# Patient Record
Sex: FEMALE | Race: WHITE | Employment: PART TIME | URBAN - METROPOLITAN AREA
[De-identification: names, ages, dates, MRNs, and addresses within clinical notes are randomized per-mention and may not be internally consistent; named-entity substitution may affect disease eponyms.]

---

## 2020-03-05 ENCOUNTER — HOSPITAL ENCOUNTER (EMERGENCY)
Age: 55
Discharge: HOME | End: 2020-03-05
Attending: EMERGENCY MEDICINE
Payer: COMMERCIAL

## 2020-03-05 ENCOUNTER — APPOINTMENT (OUTPATIENT)
Dept: GENERAL RADIOLOGY | Age: 55
End: 2020-03-05
Payer: COMMERCIAL

## 2020-03-05 VITALS
DIASTOLIC BLOOD PRESSURE: 73 MMHG | BODY MASS INDEX: 35.36 KG/M2 | OXYGEN SATURATION: 97 % | SYSTOLIC BLOOD PRESSURE: 149 MMHG | HEIGHT: 61 IN | TEMPERATURE: 98.2 F | RESPIRATION RATE: 18 BRPM | WEIGHT: 187.3 LBS | HEART RATE: 92 BPM

## 2020-03-05 PROCEDURE — 72100 X-RAY EXAM L-S SPINE 2/3 VWS: CPT

## 2020-03-05 PROCEDURE — 72040 X-RAY EXAM NECK SPINE 2-3 VW: CPT

## 2020-03-05 PROCEDURE — 99283 EMERGENCY DEPT VISIT LOW MDM: CPT

## 2020-03-05 PROCEDURE — 73110 X-RAY EXAM OF WRIST: CPT

## 2020-03-05 RX ORDER — NAPROXEN 500 MG/1
500 TABLET ORAL 2 TIMES DAILY
Qty: 20 TABLET | Refills: 0 | Status: SHIPPED | OUTPATIENT
Start: 2020-03-05 | End: 2021-01-09

## 2020-03-05 RX ORDER — FLUOXETINE HYDROCHLORIDE 20 MG/1
20 CAPSULE ORAL DAILY
COMMUNITY
Start: 2019-07-05 | End: 2021-01-09

## 2020-03-05 ASSESSMENT — ENCOUNTER SYMPTOMS
SHORTNESS OF BREATH: 0
TROUBLE SWALLOWING: 0
PHOTOPHOBIA: 0
VOICE CHANGE: 0
FACIAL SWELLING: 0
BACK PAIN: 1
COLOR CHANGE: 1
NAUSEA: 0
VOMITING: 0

## 2020-03-05 ASSESSMENT — PAIN SCALES - GENERAL: PAINLEVEL_OUTOF10: 10

## 2020-03-05 NOTE — LETTER
[facility] ED  [address] [address]  Phone: [phone]             March 5, 2020    Patient: Sher Clark   YOB: 1965   Date of Visit: 3/5/2020       To Whom It May Concern:    Raymond Ward was seen and treated in our emergency department on 3/5/2020. Please excuse her from work 3-5-2020 and 3-6-2020.     Sincerely,             Signature:__________________________________

## 2020-03-06 NOTE — ED PROVIDER NOTES
eMERGENCY dEPARTMENT eNCOUnter   [address] Name: Candace Ferrera  MRN: [id number]  [place] 1965  Date of evaluation: 3/6/20     Candace Ferrera is a 60 y.o. female with CC: Back Pain (fell at work) and Hand Pain (right)      Based on the medical record the care appears appropriate. I was personally available for consultation in the Emergency Department.     Saima Tolentino MD  Attending Emergency Physician                    Louisa Villa MD  03/06/20 3235
fatigue and fever. HENT: Negative for ear discharge, ear pain, facial swelling, nosebleeds, trouble swallowing and voice change. Eyes: Negative for photophobia and visual disturbance. Respiratory: Negative for shortness of breath. Cardiovascular: Negative for chest pain. Gastrointestinal: Negative for nausea and vomiting. Genitourinary: Negative for difficulty urinating, dysuria and hematuria. Musculoskeletal: Positive for arthralgias, back pain, myalgias and neck pain. Negative for gait problem. Skin: Positive for color change. Negative for rash and wound. Neurological: Negative for dizziness, syncope, facial asymmetry, speech difficulty, weakness, light-headedness, numbness and headaches. Psychiatric/Behavioral: Negative for confusion. Except as noted above the remainder of the review of systems was reviewed and negative. PHYSICAL EXAM    (up to 7 for level 4, 8 or more for level 5)     ED Triage Vitals   BP Temp Temp src Pulse Resp SpO2 Height Weight   03/05/20 2109 03/05/20 2109 -- 03/05/20 2109 03/05/20 2109 03/05/20 2109 03/05/20 2110 03/05/20 2110   (!) 149/73 98.2 °F (36.8 °C)  92 18 97 % 5' 1\" (1.549 m) 187 lb 4.8 oz (85 kg)     Physical Exam  Vitals signs reviewed. Constitutional:       General: She is not in acute distress. Appearance: She is well-developed. She is not diaphoretic. HENT:      Right Ear: External ear normal.      Left Ear: External ear normal.      Nose: Nose normal.      Mouth/Throat:      Mouth: Mucous membranes are moist.      Pharynx: Oropharynx is clear. Eyes:      Extraocular Movements: Extraocular movements intact. Conjunctiva/sclera: Conjunctivae normal.      Pupils: Pupils are equal, round, and reactive to light. Cardiovascular:      Pulses: Normal pulses. Pulmonary:      Effort: Pulmonary effort is normal. No respiratory distress. Musculoskeletal:      Right wrist: She exhibits decreased range of motion and tenderness.  She

## 2020-08-06 ENCOUNTER — APPOINTMENT (OUTPATIENT)
Dept: GENERAL RADIOLOGY | Age: 55
End: 2020-08-06
Payer: COMMERCIAL

## 2020-08-06 ENCOUNTER — HOSPITAL ENCOUNTER (EMERGENCY)
Age: 55
Discharge: HOME | End: 2020-08-06
Attending: EMERGENCY MEDICINE
Payer: COMMERCIAL

## 2020-08-06 VITALS
RESPIRATION RATE: 12 BRPM | HEART RATE: 74 BPM | SYSTOLIC BLOOD PRESSURE: 147 MMHG | DIASTOLIC BLOOD PRESSURE: 80 MMHG | OXYGEN SATURATION: 99 % | TEMPERATURE: 98.3 F

## 2020-08-06 PROCEDURE — 71046 X-RAY EXAM CHEST 2 VIEWS: CPT

## 2020-08-06 PROCEDURE — 96372 THER/PROPH/DIAG INJ SC/IM: CPT

## 2020-08-06 PROCEDURE — 99283 EMERGENCY DEPT VISIT LOW MDM: CPT

## 2020-08-06 PROCEDURE — 6360000002 HC RX W HCPCS: Performed by: EMERGENCY MEDICINE

## 2020-08-06 RX ORDER — CYCLOBENZAPRINE HCL 10 MG
10 TABLET ORAL EVERY 8 HOURS PRN
Qty: 20 TABLET | Refills: 0 | Status: SHIPPED | OUTPATIENT
Start: 2020-08-06 | End: 2021-01-09

## 2020-08-06 RX ORDER — KETOROLAC TROMETHAMINE 30 MG/ML
60 INJECTION, SOLUTION INTRAMUSCULAR; INTRAVENOUS ONCE
Status: COMPLETED | OUTPATIENT
Start: 2020-08-06 | End: 2020-08-06

## 2020-08-06 RX ORDER — ACETAMINOPHEN AND CODEINE PHOSPHATE 300; 30 MG/1; MG/1
1 TABLET ORAL EVERY 6 HOURS PRN
Qty: 20 TABLET | Refills: 0 | Status: SHIPPED | OUTPATIENT
Start: 2020-08-06 | End: 2020-08-11

## 2020-08-06 RX ADMIN — KETOROLAC TROMETHAMINE 60 MG: 30 INJECTION, SOLUTION INTRAMUSCULAR at 15:42

## 2020-08-06 ASSESSMENT — ENCOUNTER SYMPTOMS
COLOR CHANGE: 0
DIARRHEA: 0
EYE DISCHARGE: 0
ABDOMINAL PAIN: 0
CONSTIPATION: 0
EYE REDNESS: 0
FACIAL SWELLING: 0
SHORTNESS OF BREATH: 0
VOMITING: 0
COUGH: 0

## 2020-08-06 ASSESSMENT — PAIN SCALES - GENERAL
PAINLEVEL_OUTOF10: 10
PAINLEVEL_OUTOF10: 9

## 2020-08-06 ASSESSMENT — PAIN DESCRIPTION - PROGRESSION: CLINICAL_PROGRESSION: GRADUALLY WORSENING

## 2020-08-06 ASSESSMENT — PAIN DESCRIPTION - FREQUENCY: FREQUENCY: CONTINUOUS

## 2020-08-06 ASSESSMENT — PAIN DESCRIPTION - LOCATION: LOCATION: BACK

## 2020-08-06 ASSESSMENT — PAIN DESCRIPTION - PAIN TYPE: TYPE: ACUTE PAIN

## 2020-08-06 ASSESSMENT — PAIN DESCRIPTION - DESCRIPTORS: DESCRIPTORS: SPASM;ACHING;SHARP

## 2020-08-06 NOTE — LETTER
[facility] ED  [address] [address]  Phone: [phone]    No name on file. August 6, 2020     Patient: Elizabeth Wilson   YOB: 1965   Date of Visit: 8/6/2020       To Whom It May Concern: It is my medical opinion that Melissa Pettit may return to work on 08/07/20 with the following restrictions: lifting/carrying not to exceed 15 lbs. .    If you have any questions or concerns, please don't hesitate to call.     Sincerely,

## 2020-08-06 NOTE — ED PROVIDER NOTES
[address] [place] [address] ED  EMERGENCY DEPARTMENT ENCOUNTER      Pt Name: Js Mcgregor  MRN: [id number]  [place] 1965  Date of evaluation: 8/6/2020  Provider: Mau Miller MD    CHIEF COMPLAINT       Chief Complaint   Patient presents with    Back Pain     sudden onset after she encounter a turning movement around 2 hours prior to arrival.          HISTORY OF PRESENT ILLNESS  (Location/Symptom, Timing/Onset, Context/Setting, Quality, Duration, Modifying Factors, Severity.)   Js Mcgregor is a 60 y.o. female who presents to the emergency department for pain. She is complaining pain to the left lateral posterior rib area. It started when she was reaching this afternoon and she felt a pulling there. She did not fall and nothing struck her there. She does not have a cough and she has no shortness of breath or fever. She rated the pain as a 9. It is sharp and it feels like a spasm. Nursing Notes were reviewed. ALLERGIES     Nubain [nalbuphine]; Dilantin [phenytoin sodium extended]; Penicillins; and Sulfa antibiotics    CURRENT MEDICATIONS       Discharge Medication List as of 8/6/2020  3:36 PM      CONTINUE these medications which have NOT CHANGED    Details   FLUoxetine (PROZAC) 20 MG capsule Take 20 mg by mouth dailyHistorical Med      naproxen (NAPROSYN) 500 MG tablet Take 1 tablet by mouth 2 times daily, Disp-20 tablet, R-0Print             PAST MEDICAL HISTORY         Diagnosis Date    Depression     Headache     Hypertension     Seizures ([address]        SURGICAL HISTORY           Procedure Laterality Date    TONSILLECTOMY      TUBAL LIGATION      UTERINE FIBROID SURGERY           FAMILY HISTORY     No family history on file. No family status information on file. SOCIAL HISTORY      reports that she has never smoked. She has never used smokeless tobacco. She reports previous alcohol use. She reports that she does not use drugs.     REVIEW OF SYSTEMS    (2-9 systems for level 4, 10 or more for level 5)     Review of Systems   Constitutional: Negative for chills, fatigue and fever. HENT: Negative for congestion, ear discharge and facial swelling. Eyes: Negative for discharge and redness. Respiratory: Negative for cough and shortness of breath. Cardiovascular: Negative for chest pain. Gastrointestinal: Negative for abdominal pain, constipation, diarrhea and vomiting. Genitourinary: Negative for dysuria and hematuria. Musculoskeletal: Negative for arthralgias. Skin: Negative for color change and rash. Neurological: Negative for syncope, numbness and headaches. Hematological: Negative for adenopathy. Psychiatric/Behavioral: Negative for confusion. The patient is not nervous/anxious. Except as noted above the remainder of the review of systems was reviewed and negative. PHYSICAL EXAM    (up to 7 for level 4, 8 or more for level 5)     Vitals:    08/06/20 1438   BP: (!) 147/80   Pulse: 74   Resp: 12   Temp: 98.3 °F (36.8 °C)   SpO2: 99%       Physical Exam  Vitals signs reviewed. Constitutional:       General: She is not in acute distress. Appearance: She is well-developed. She is not diaphoretic. HENT:      Head: Normocephalic and atraumatic. Eyes:      General: No scleral icterus. Right eye: No discharge. Left eye: No discharge. Neck:      Musculoskeletal: Neck supple. Cardiovascular:      Rate and Rhythm: Normal rate and regular rhythm. Pulmonary:      Effort: Pulmonary effort is normal. No respiratory distress. Breath sounds: Normal breath sounds. No stridor. No wheezing or rales. Comments: There is no bruise or rash in her flank area. There seems to be some tenderness in the left flank area but there is no crepitus. Abdominal:      General: There is no distension. Palpations: Abdomen is soft. Tenderness: There is no abdominal tenderness. Musculoskeletal: Normal range of motion.    Lymphadenopathy: Cervical: No cervical adenopathy. Skin:     General: Skin is warm and dry. Findings: No erythema or rash. Neurological:      Mental Status: She is alert and oriented to person, place, and time. Psychiatric:         Behavior: Behavior normal.             DIAGNOSTIC RESULTS     EKG: All EKG's are interpreted by the Emergency Department Physician who either signs or Co-signs this chart in the absence of a cardiologist.    Not indicated    RADIOLOGY:   Non-plain film images such as CT, Ultrasound and MRI are read by the radiologist. Perla Garcia radiographic images are visualized and preliminarily interpreted by the emergency physician with the below findings:    Interpretation per the Radiologist below, if available at the time of this note:    Xr Chest (2 Vw)    Result Date: 8/6/2020  EXAMINATION: TWO XRAY VIEWS OF THE CHEST 8/6/2020 3:19 pm COMPARISON: Chest radiograph performed 04/13/2011. HISTORY: ORDERING SYSTEM PROVIDED HISTORY: left lateral posterior pain TECHNOLOGIST PROVIDED HISTORY: left lateral posterior pain Reason for Exam: Pain to left lateral chest wall without injury. Acuity: Acute Type of Exam: Initial FINDINGS: There is no acute consolidation or effusion. There is no pneumothorax. The mediastinal structures are unremarkable. The upper abdomen is unremarkable. The extrathoracic soft tissues are unremarkable. There is no acute osseous abnormality. No acute cardiopulmonary process. LABS:  Labs Reviewed - No data to display    All other labs were within normal range or not returned as of this dictation.     EMERGENCY DEPARTMENT COURSE and DIFFERENTIAL DIAGNOSIS/MDM:   Vitals:    Vitals:    08/06/20 1438   BP: (!) 147/80   Pulse: 74   Resp: 12   Temp: 98.3 °F (36.8 °C)   SpO2: 99%       Orders Placed This Encounter   Medications    ketorolac (TORADOL) injection 60 mg    acetaminophen-codeine (TYLENOL/CODEINE #3) 300-30 MG per tablet     Sig: Take 1 tablet by mouth every 6 hours as needed for Pain for up to 5 days. Dispense:  20 tablet     Refill:  0    cyclobenzaprine (FLEXERIL) 10 MG tablet     Sig: Take 1 tablet by mouth every 8 hours as needed for Muscle spasms     Dispense:  20 tablet     Refill:  0       Medical Decision Making: X-rays negative. No evidence of fracture or pneumothorax. Should be treated symptomatically with IM Toradol here and was prescribed Tylenol 3 and Flexeril. Treatment diagnosis and follow-up were discussed with the patient      CONSULTS:  None    PROCEDURES:  None    FINAL IMPRESSION      1. Chest wall muscle strain, initial encounter          DISPOSITION/PLAN   DISPOSITION Decision To Discharge 08/06/2020 03:35:14 PM      PATIENT REFERRED TO:   Miko Monet DO  [address] [address]  [phone]      As needed    [facility] ED  [address] [facility]  [phone]    If symptoms worsen      DISCHARGE MEDICATIONS:     Discharge Medication List as of 8/6/2020  3:36 PM      START taking these medications    Details   acetaminophen-codeine (TYLENOL/CODEINE #3) 300-30 MG per tablet Take 1 tablet by mouth every 6 hours as needed for Pain for up to 5 days. , Disp-20 tablet,R-0Print      cyclobenzaprine (FLEXERIL) 10 MG tablet Take 1 tablet by mouth every 8 hours as needed for Muscle spasms, Disp-20 tablet,R-0Print               (Please note that portions of this note were completed with a voice recognition program.  Efforts were made to edit the dictations but occasionally words are mis-transcribed.)    Lawyer Carly MD  Attending Emergency Physician           Lawyer Carly MD  08/06/20 Greer Light MD  08/18/20 9154

## 2021-01-09 ENCOUNTER — APPOINTMENT (OUTPATIENT)
Dept: GENERAL RADIOLOGY | Age: 56
End: 2021-01-09
Payer: COMMERCIAL

## 2021-01-09 ENCOUNTER — HOSPITAL ENCOUNTER (EMERGENCY)
Age: 56
Discharge: HOME | End: 2021-01-09
Attending: EMERGENCY MEDICINE
Payer: COMMERCIAL

## 2021-01-09 ENCOUNTER — APPOINTMENT (OUTPATIENT)
Dept: CT IMAGING | Age: 56
End: 2021-01-09
Payer: COMMERCIAL

## 2021-01-09 VITALS
HEART RATE: 90 BPM | OXYGEN SATURATION: 97 % | WEIGHT: 148 LBS | BODY MASS INDEX: 29.06 KG/M2 | RESPIRATION RATE: 18 BRPM | TEMPERATURE: 99 F | SYSTOLIC BLOOD PRESSURE: 143 MMHG | DIASTOLIC BLOOD PRESSURE: 85 MMHG | HEIGHT: 60 IN

## 2021-01-09 DIAGNOSIS — R06.89 DYSPNEA AND RESPIRATORY ABNORMALITIES: Primary | ICD-10-CM

## 2021-01-09 DIAGNOSIS — R06.00 DYSPNEA AND RESPIRATORY ABNORMALITIES: Primary | ICD-10-CM

## 2021-01-09 LAB
ABSOLUTE EOS #: 0.12 K/UL (ref 0–0.44)
ABSOLUTE IMMATURE GRANULOCYTE: 0.03 K/UL (ref 0–0.3)
ABSOLUTE LYMPH #: 1.07 K/UL (ref 1.1–3.7)
ABSOLUTE MONO #: 0.51 K/UL (ref 0.1–1.2)
ANION GAP SERPL CALCULATED.3IONS-SCNC: 13 MMOL/L (ref 9–17)
BASOPHILS # BLD: 1 % (ref 0–2)
BASOPHILS ABSOLUTE: 0.05 K/UL (ref 0–0.2)
BNP INTERPRETATION: NORMAL
BUN BLDV-MCNC: 12 MG/DL (ref 6–20)
BUN/CREAT BLD: 16 (ref 9–20)
CALCIUM SERPL-MCNC: 9.3 MG/DL (ref 8.6–10.4)
CHLORIDE BLD-SCNC: 103 MMOL/L (ref 98–107)
CO2: 25 MMOL/L (ref 20–31)
CREAT SERPL-MCNC: 0.73 MG/DL (ref 0.5–0.9)
DIFFERENTIAL TYPE: ABNORMAL
EKG ATRIAL RATE: 99 BPM
EKG P AXIS: 59 DEGREES
EKG P-R INTERVAL: 192 MS
EKG Q-T INTERVAL: 350 MS
EKG QRS DURATION: 94 MS
EKG QTC CALCULATION (BAZETT): 449 MS
EKG R AXIS: 22 DEGREES
EKG T AXIS: 41 DEGREES
EKG VENTRICULAR RATE: 99 BPM
EOSINOPHILS RELATIVE PERCENT: 2 % (ref 1–4)
GFR AFRICAN AMERICAN: >60 ML/MIN
GFR NON-AFRICAN AMERICAN: >60 ML/MIN
GFR SERPL CREATININE-BSD FRML MDRD: ABNORMAL ML/MIN/{1.73_M2}
GFR SERPL CREATININE-BSD FRML MDRD: ABNORMAL ML/MIN/{1.73_M2}
GLUCOSE BLD-MCNC: 112 MG/DL (ref 70–99)
HCT VFR BLD CALC: 33.6 % (ref 36.3–47.1)
HEMOGLOBIN: 10.8 G/DL (ref 11.9–15.1)
IMMATURE GRANULOCYTES: 0 %
LYMPHOCYTES # BLD: 15 % (ref 24–43)
MCH RBC QN AUTO: 29.4 PG (ref 25.2–33.5)
MCHC RBC AUTO-ENTMCNC: 32.1 G/DL (ref 28.4–34.8)
MCV RBC AUTO: 91.6 FL (ref 82.6–102.9)
MONOCYTES # BLD: 7 % (ref 3–12)
NRBC AUTOMATED: 0 PER 100 WBC
PDW BLD-RTO: 11.8 % (ref 11.8–14.4)
PLATELET # BLD: 409 K/UL (ref 138–453)
PLATELET ESTIMATE: ABNORMAL
PMV BLD AUTO: 10.6 FL (ref 8.1–13.5)
POTASSIUM SERPL-SCNC: 3.8 MMOL/L (ref 3.7–5.3)
PRO-BNP: 203 PG/ML
RBC # BLD: 3.67 M/UL (ref 3.95–5.11)
RBC # BLD: ABNORMAL 10*6/UL
SEG NEUTROPHILS: 75 % (ref 36–65)
SEGMENTED NEUTROPHILS ABSOLUTE COUNT: 5.3 K/UL (ref 1.5–8.1)
SODIUM BLD-SCNC: 141 MMOL/L (ref 135–144)
TROPONIN INTERP: NORMAL
TROPONIN INTERP: NORMAL
TROPONIN T: NORMAL NG/ML
TROPONIN T: NORMAL NG/ML
TROPONIN, HIGH SENSITIVITY: 10 NG/L (ref 0–14)
TROPONIN, HIGH SENSITIVITY: 9 NG/L (ref 0–14)
WBC # BLD: 7.1 K/UL (ref 3.5–11.3)
WBC # BLD: ABNORMAL 10*3/UL

## 2021-01-09 PROCEDURE — 85025 COMPLETE CBC W/AUTO DIFF WBC: CPT

## 2021-01-09 PROCEDURE — 71260 CT THORAX DX C+: CPT

## 2021-01-09 PROCEDURE — 71045 X-RAY EXAM CHEST 1 VIEW: CPT

## 2021-01-09 PROCEDURE — 80048 BASIC METABOLIC PNL TOTAL CA: CPT

## 2021-01-09 PROCEDURE — 84484 ASSAY OF TROPONIN QUANT: CPT

## 2021-01-09 PROCEDURE — 6360000004 HC RX CONTRAST MEDICATION: Performed by: EMERGENCY MEDICINE

## 2021-01-09 PROCEDURE — 93005 ELECTROCARDIOGRAM TRACING: CPT | Performed by: EMERGENCY MEDICINE

## 2021-01-09 PROCEDURE — 99283 EMERGENCY DEPT VISIT LOW MDM: CPT

## 2021-01-09 PROCEDURE — 83880 ASSAY OF NATRIURETIC PEPTIDE: CPT

## 2021-01-09 PROCEDURE — 2580000003 HC RX 258: Performed by: EMERGENCY MEDICINE

## 2021-01-09 RX ORDER — 0.9 % SODIUM CHLORIDE 0.9 %
80 INTRAVENOUS SOLUTION INTRAVENOUS ONCE
Status: COMPLETED | OUTPATIENT
Start: 2021-01-09 | End: 2021-01-09

## 2021-01-09 RX ORDER — HYDROCHLOROTHIAZIDE 25 MG/1
25 TABLET ORAL DAILY
COMMUNITY
Start: 2020-08-11

## 2021-01-09 RX ORDER — IRBESARTAN 300 MG/1
300 TABLET ORAL DAILY
COMMUNITY
Start: 2020-08-13

## 2021-01-09 RX ORDER — SODIUM CHLORIDE 0.9 % (FLUSH) 0.9 %
10 SYRINGE (ML) INJECTION PRN
Status: DISCONTINUED | OUTPATIENT
Start: 2021-01-09 | End: 2021-01-09 | Stop reason: HOSPADM

## 2021-01-09 RX ORDER — ATORVASTATIN CALCIUM 20 MG/1
20 TABLET, FILM COATED ORAL DAILY
COMMUNITY
Start: 2020-08-11

## 2021-01-09 RX ORDER — BUPROPION HYDROCHLORIDE 150 MG/1
TABLET ORAL
COMMUNITY
Start: 2020-10-14

## 2021-01-09 RX ORDER — 0.9 % SODIUM CHLORIDE 0.9 %
1000 INTRAVENOUS SOLUTION INTRAVENOUS ONCE
Status: COMPLETED | OUTPATIENT
Start: 2021-01-09 | End: 2021-01-09

## 2021-01-09 RX ORDER — RISPERIDONE 0.25 MG/1
0.25 TABLET, FILM COATED ORAL DAILY
COMMUNITY
Start: 2020-07-23

## 2021-01-09 RX ADMIN — SODIUM CHLORIDE 80 ML: 0.9 INJECTION, SOLUTION INTRAVENOUS at 11:10

## 2021-01-09 RX ADMIN — SODIUM CHLORIDE, PRESERVATIVE FREE 10 ML: 5 INJECTION INTRAVENOUS at 11:11

## 2021-01-09 RX ADMIN — SODIUM CHLORIDE 1000 ML: 9 INJECTION, SOLUTION INTRAVENOUS at 10:51

## 2021-01-09 RX ADMIN — IOPAMIDOL 75 ML: 755 INJECTION, SOLUTION INTRAVENOUS at 11:11

## 2021-01-09 ASSESSMENT — ENCOUNTER SYMPTOMS
COLOR CHANGE: 0
VOMITING: 0
SORE THROAT: 0
DIARRHEA: 0
NAUSEA: 0
COUGH: 1
EYE REDNESS: 0
EYE DISCHARGE: 0
SHORTNESS OF BREATH: 1
RHINORRHEA: 0

## 2021-01-09 NOTE — ED PROVIDER NOTES
EMERGENCY DEPARTMENT ENCOUNTER    Pt Name: Ki Maurice  MRN: [id number]  [place] 1965  Date of evaluation: 1/9/21  CHIEF COMPLAINT       Chief Complaint   Patient presents with    Positive For Covid-19    Cough     HISTORY OF PRESENT ILLNESS   This is a 78-year-old female that presents with complaints of some shortness of breath, dyspnea on exertion and generally not feeling well. The patient states that she was diagnosed with COVID-19 on 23 December, the patient has had continued and persistent shortness of breath. Patient states that she went back to work a few days ago, she notes that when she is doing her activities at work she feels more short of breath than she typically would, she states that she has much less energy and feels very fatigued. Patient describes her symptoms as severe, without any specific alleviating factors, she denies any previous history of DVT or pulmonary embolism. She has no nausea or vomiting, she denies any cough or sputum production. REVIEW OF SYSTEMS     Review of Systems   Constitutional: Positive for fatigue. Negative for chills and fever. HENT: Negative for rhinorrhea and sore throat. Eyes: Negative for discharge, redness and visual disturbance. Respiratory: Positive for cough and shortness of breath. Cardiovascular: Positive for chest pain. Negative for palpitations and leg swelling. Gastrointestinal: Negative for diarrhea, nausea and vomiting. Musculoskeletal: Negative for arthralgias, myalgias and neck pain. Skin: Negative for color change and rash. Neurological: Positive for weakness. Negative for seizures and headaches. Psychiatric/Behavioral: Negative for hallucinations, self-injury and suicidal ideas. PASTMEDICAL HISTORY     Past Medical History:   Diagnosis Date    Depression     Headache     Hypertension     Seizures (HCC)      Past Problem List  There is no problem list on file for this patient.     SURGICAL HISTORY       Past Surgical History:   Procedure Laterality Date    TONSILLECTOMY      TUBAL LIGATION      UTERINE FIBROID SURGERY       CURRENT MEDICATIONS       Current Discharge Medication List      CONTINUE these medications which have NOT CHANGED    Details   atorvastatin (LIPITOR) 20 MG tablet Take 20 mg by mouth daily      buPROPion (WELLBUTRIN XL) 150 MG extended release tablet take 1 tablet by mouth once daily      hydroCHLOROthiazide (HYDRODIURIL) 25 MG tablet Take 25 mg by mouth daily      irbesartan (AVAPRO) 300 MG tablet Take 300 mg by mouth daily      risperiDONE (RISPERDAL) 0.25 MG tablet Take 0.25 mg by mouth daily           ALLERGIES     is allergic to nubain [nalbuphine]; dilantin [phenytoin sodium extended]; penicillins; and sulfa antibiotics. FAMILY HISTORY     has no family status information on file. SOCIAL HISTORY       Social History     Tobacco Use    Smoking status: Never Smoker    Smokeless tobacco: Never Used   Substance Use Topics    Alcohol use: Not Currently    Drug use: Never     PHYSICAL EXAM     INITIAL VITALS: BP (!) 143/85   Pulse 90   Temp 99 °F (37.2 °C) (Oral)   Resp 18   Ht 5' (1.524 m)   Wt 148 lb (67.1 kg)   SpO2 97%   BMI 28.90 kg/m²    Physical Exam  Constitutional:       Appearance: Normal appearance. She is well-developed. She is not ill-appearing or toxic-appearing. HENT:      Head: Normocephalic and atraumatic. Eyes:      Conjunctiva/sclera: Conjunctivae normal.      Pupils: Pupils are equal, round, and reactive to light. Neck:      Musculoskeletal: Normal range of motion and neck supple. Trachea: Trachea normal.   Cardiovascular:      Rate and Rhythm: Normal rate and regular rhythm. Heart sounds: S1 normal and S2 normal. No murmur. Pulmonary:      Effort: Pulmonary effort is normal. No accessory muscle usage or respiratory distress. Breath sounds: Normal breath sounds. Chest:      Chest wall: No deformity or tenderness. Abdominal:      General: Bowel sounds are normal. There is no distension or abdominal bruit. Palpations: Abdomen is not rigid. Tenderness: There is no abdominal tenderness. There is no guarding or rebound. Skin:     General: Skin is warm. Findings: No rash. Neurological:      Mental Status: She is alert and oriented to person, place, and time. GCS: GCS eye subscore is 4. GCS verbal subscore is 5. GCS motor subscore is 6. Psychiatric:         Speech: Speech normal.         MEDICAL DECISION MAKIN-year-old female presents with complaints of chest pain, some shortness of breath and generally not feeling well. The patient symptoms could be related to deconditioning, possibly related to a pulmonary embolism versus acute coronary syndrome versus other. Plan is cardiac enzymes basic labs D-dimer and reevaluation. 12:12 PM EST  Patient's troponins x2 are negative, CT chest shows no evidence of acute cardiopulmonary pathology. Plan is discharged with outpatient follow-up. Return if symptoms worsen or change. CRITICAL CARE:       PROCEDURES:    Procedures    DIAGNOSTIC RESULTS   EKG:All EKG's are interpreted by the Emergency Department Physician who either signs or Co-signs this chart in the absence of a cardiologist.    Patient's EKG shows sinus rhythm with a rate of 99, NV QRS QTC intervals unremarkable patient has normal axis, no ST elevations or depressions, no significant T wave changes. Nonspecific EKG. RADIOLOGY:All plain film, CT, MRI, and formal ultrasound images (except ED bedside ultrasound) are read by the radiologist, see reports below, unless otherwisenoted in MDM or here. CT CHEST PULMONARY EMBOLISM W CONTRAST   Final Result   *No CT evidence of acute pulmonary embolism. *Predominantly peripheral consolidations are identified throughout both lungs   primarily within the lower lobes. Findings likely relate to the patient's   known COVID diagnosis. *Questionable spiculated mass superior aspect of the visualized right breast   measuring 2.0 x 1.9 cm. Malignancy cannot be excluded and further evaluation   with diagnostic mammography and ultrasound is recommended. XR CHEST PORTABLE   Final Result   *Borderline prominent cardiomediastinal silhouette which may relate to   borderline cardiomegaly or possibly pericardial effusion. *Mild bilateral perihilar prominence which may reflect vascular congestion,   bronchitis or other infectious inflammatory process. *Right basilar opacities suggestive of atelectasis, though developing   pneumonia not excluded. LABS: All lab results were reviewed by myself, and all abnormals are listed below. Labs Reviewed   BASIC METABOLIC PANEL - Abnormal; Notable for the following components:       Result Value    Glucose 112 (*)     All other components within normal limits   CBC WITH AUTO DIFFERENTIAL - Abnormal; Notable for the following components:    RBC 3.67 (*)     Hemoglobin 10.8 (*)     Hematocrit 33.6 (*)     Seg Neutrophils 75 (*)     Lymphocytes 15 (*)     Absolute Lymph # 1.07 (*)     All other components within normal limits   BRAIN NATRIURETIC PEPTIDE   TROPONIN   TROPONIN       EMERGENCY DEPARTMENTCOURSE:         Vitals:    Vitals:    01/09/21 0917 01/09/21 1202   BP: (!) 148/86 (!) 143/85   Pulse: 110 90   Resp: 16 18   Temp: 99 °F (37.2 °C)    TempSrc: Oral    SpO2: 94% 97%   Weight: 148 lb (67.1 kg)    Height: 5' (1.524 m)        The patient was given the following medications while in the emergency department:  Orders Placed This Encounter   Medications    0.9 % sodium chloride bolus    iopamidol (ISOVUE-370) 76 % injection 75 mL    sodium chloride flush 0.9 % injection 10 mL    0.9 % sodium chloride bolus     CONSULTS:  None    FINAL IMPRESSION      1.  Dyspnea and respiratory abnormalities          DISPOSITION/PLAN   DISPOSITION Decision To Discharge 01/09/2021 12:11:09 PM      PATIENT REFERRED TO:  Joelle Palomares DO  [address] [address]  [phone]    Schedule an appointment as soon as possible for a visit in 2 days      DISCHARGE MEDICATIONS:  Current Discharge Medication List        Elen Jean MD  Attending Emergency Physician                   Elen Jean MD  01/09/21 9795